# Patient Record
Sex: MALE | Race: WHITE | NOT HISPANIC OR LATINO | Employment: OTHER | URBAN - METROPOLITAN AREA
[De-identification: names, ages, dates, MRNs, and addresses within clinical notes are randomized per-mention and may not be internally consistent; named-entity substitution may affect disease eponyms.]

---

## 2017-11-16 ENCOUNTER — TELEPHONE (OUTPATIENT)
Dept: ORTHOPEDICS | Facility: CLINIC | Age: 52
End: 2017-11-16

## 2017-11-16 NOTE — TELEPHONE ENCOUNTER
----- Message from Michelle Cruz sent at 11/13/2017 11:47 AM CST -----  Regarding: Outside patient referral  Good morning,    Patient is being referred to Dr. Anthony by Carolin Joyner Np at [facility]. I have scanned the referral and records into . Appointment has been scheduled for 11/16 at 2pm. Ar Ireland states Dr. Anthony can contact her for additional information regarding this patient. Nicki Joyner can be reached at [phone].     Thank you,  Michelle

## 2017-12-18 ENCOUNTER — HOSPITAL ENCOUNTER (OUTPATIENT)
Dept: RADIOLOGY | Facility: HOSPITAL | Age: 52
Discharge: HOME | End: 2017-12-18
Attending: ORTHOPAEDIC SURGERY
Payer: MEDICARE

## 2017-12-18 ENCOUNTER — OFFICE VISIT (OUTPATIENT)
Dept: ORTHOPEDICS | Facility: CLINIC | Age: 52
End: 2017-12-18
Payer: MEDICARE

## 2017-12-18 VITALS — WEIGHT: 185 LBS | BODY MASS INDEX: 25.9 KG/M2 | HEIGHT: 71 IN

## 2017-12-18 DIAGNOSIS — M25.571 RIGHT ANKLE PAIN, UNSPECIFIED CHRONICITY: ICD-10-CM

## 2017-12-18 DIAGNOSIS — M79.671 RIGHT FOOT PAIN: ICD-10-CM

## 2017-12-18 DIAGNOSIS — M25.571 RIGHT ANKLE PAIN, UNSPECIFIED CHRONICITY: Primary | ICD-10-CM

## 2017-12-18 PROCEDURE — 99999 PR PBB SHADOW E&M-EST. PATIENT-LVL III: CPT | Mod: PBBFAC,,, | Performed by: ORTHOPAEDIC SURGERY

## 2017-12-18 PROCEDURE — 73610 X-RAY EXAM OF ANKLE: CPT | Mod: 26,RT,, | Performed by: RADIOLOGY

## 2017-12-18 PROCEDURE — 99203 OFFICE O/P NEW LOW 30 MIN: CPT | Mod: S$PBB,,, | Performed by: ORTHOPAEDIC SURGERY

## 2017-12-18 PROCEDURE — 73630 X-RAY EXAM OF FOOT: CPT | Mod: TC,RT

## 2017-12-18 PROCEDURE — 99213 OFFICE O/P EST LOW 20 MIN: CPT | Mod: PBBFAC,25 | Performed by: ORTHOPAEDIC SURGERY

## 2017-12-18 PROCEDURE — 73630 X-RAY EXAM OF FOOT: CPT | Mod: 26,RT,, | Performed by: RADIOLOGY

## 2017-12-18 PROCEDURE — 73610 X-RAY EXAM OF ANKLE: CPT | Mod: TC,RT

## 2017-12-18 RX ORDER — GABAPENTIN 300 MG/1
300 CAPSULE ORAL 3 TIMES DAILY
COMMUNITY
Start: 2017-12-13

## 2017-12-18 RX ORDER — OMEPRAZOLE 20 MG/1
20 CAPSULE, DELAYED RELEASE ORAL DAILY
COMMUNITY
Start: 2017-12-13

## 2017-12-18 RX ORDER — HYDROXYCHLOROQUINE SULFATE 200 MG/1
200 TABLET, FILM COATED ORAL
COMMUNITY
Start: 2017-12-13

## 2017-12-18 RX ORDER — PREDNISONE 2.5 MG/1
2.5 TABLET ORAL DAILY
COMMUNITY
Start: 2017-12-13

## 2017-12-18 RX ORDER — GOLIMUMAB 50 MG/.5ML
INJECTION, SOLUTION SUBCUTANEOUS
COMMUNITY
Start: 2017-12-15

## 2017-12-18 RX ORDER — LISINOPRIL 20 MG/1
20 TABLET ORAL DAILY
COMMUNITY
Start: 2017-12-13

## 2017-12-18 RX ORDER — TRAMADOL HYDROCHLORIDE 50 MG/1
50 TABLET ORAL
COMMUNITY
Start: 2017-11-19

## 2017-12-18 RX ORDER — VERAPAMIL HYDROCHLORIDE 180 MG/1
180 TABLET, FILM COATED, EXTENDED RELEASE ORAL DAILY
COMMUNITY
Start: 2017-11-21

## 2017-12-18 RX ORDER — METHOTREXATE 2.5 MG/1
2.5 TABLET ORAL
COMMUNITY
Start: 2017-12-13

## 2017-12-18 RX ORDER — LEFLUNOMIDE 20 MG/1
20 TABLET ORAL DAILY
COMMUNITY
Start: 2017-12-13

## 2017-12-18 RX ORDER — COLCHICINE 0.6 MG/1
6 CAPSULE ORAL
COMMUNITY
Start: 2017-11-15

## 2017-12-18 RX ORDER — DICLOFENAC SODIUM 75 MG/1
75 TABLET, DELAYED RELEASE ORAL DAILY
COMMUNITY
Start: 2017-12-13

## 2017-12-18 RX ORDER — LEVOFLOXACIN 500 MG/1
TABLET, FILM COATED ORAL
COMMUNITY
Start: 2017-09-16

## 2017-12-18 NOTE — PROGRESS NOTES
HPI:  51 yo male with RA who presents for second opinion of his right foot and ankle. He is s/p multiple revision ankle revisions done in [place]. He states that they have all failed due to possible infection and broken hardware. He is very active, works on his land daily. He is interested in other surgical options, including possible amputation. He reports persistent pain in his right foot and inability to wear any sort of shoe.     History reviewed. No pertinent past medical history.  Past Surgical History:   Procedure Laterality Date    ANKLE SURGERY Right     x 3    BACK SURGERY      x 2       Current Outpatient Prescriptions:     diclofenac (VOLTAREN) 75 MG EC tablet, 75 mg once daily. , Disp: , Rfl:     gabapentin (NEURONTIN) 300 MG capsule, 300 mg 3 (three) times daily. , Disp: , Rfl:     hydroxychloroquine (PLAQUENIL) 200 mg tablet, 200 mg. , Disp: , Rfl:     leflunomide (ARAVA) 20 MG Tab, 20 mg once daily. , Disp: , Rfl:     lisinopril (PRINIVIL,ZESTRIL) 20 MG tablet, 20 mg once daily. , Disp: , Rfl:     methotrexate 2.5 MG Tab, 2.5 mg every 7 days. Pt states takes six tablets every 7 days, Disp: , Rfl:     MITIGARE 0.6 mg Cap, 6 mg as needed. , Disp: , Rfl:     omeprazole (PRILOSEC) 20 MG capsule, 20 mg once daily. , Disp: , Rfl:     predniSONE (DELTASONE) 2.5 MG tablet, 2.5 mg once daily. , Disp: , Rfl:     SIMPONI 50 mg/0.5 mL PnIj, every 30 days. , Disp: , Rfl:     traMADol (ULTRAM) 50 mg tablet, 50 mg as needed. , Disp: , Rfl:     verapamil (CALAN-SR) 180 MG CR tablet, 180 mg once daily. , Disp: , Rfl:     levoFLOXacin (LEVAQUIN) 500 MG tablet, , Disp: , Rfl:   Review of patient's allergies indicates:  No Known Allergies  Social History     Social History Narrative    No narrative on file     No family history on file.      ROS:  Patient denies constitutional symptoms, cardiac symptoms, respiratory symptoms, GI symptoms.  The remainder of the musculoskeletal ROS is included in the  HPI.    PE:    AA&O x 4.  NAD  HEENT:  NCAT, sclera nonicteric  Lungs:  Respirations are equal and unlabored.  CV:  2+ bilateral upper and lower extremity pulses.  Skin:  Intact throughout.    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 At this point, his foot is not reconstructible. His best option at this point is a BKA which would allow him the function he desires. We discussed having [place] [facility] [place]. We gave him the names of other orthopedic surgeons in his area and he will reach out to them. He will call us and let us know if he desires to have the surgery done here.     I have personally taken the history and examined this patient and agree with the residents note as stated above.  Mr Berman's foot and ankle are not reconstructable in my hands. My only definitive surgical option would be a below knee amputation. There may be other foot and ankle surgeons who may be able to offer other options. A below knee amputation is a standard procedure that could be performed closer to his home in [facility].

## 2017-12-18 NOTE — LETTER
December 19, 2017      Carolin Joyner, LEIFP-C  [address]  [facility]    [id number]  [place] [address]           [facility]  [address]  [place] [facility] [address]  Phone: [phone]          Patient: Curtis Berman   MR Number: [id number]   YOB: 1965   Date of Visit: 12/18/2017       Dear Carolin Joyner:    Thank you for referring Curtis Berman to me for evaluation. Attached you will find relevant portions of my assessment and plan of care.    If you have questions, please do not hesitate to call me. I look forward to following Curtis Berman along with you.    Sincerely,    Heriberto Anthony MD    Enclosure  CC:  No Recipients    If you would like to receive this communication electronically, please contact [email or web address] or [phone] to request more information on [employer] access.    For providers and/or their staff who would like to refer a patient to [facility], please contact us through our one-stop-shop provider referral line, [facility], at [phone].    If you feel you have received this communication in error or would no longer like to receive these types of communications, please e-mail [email or web address]